# Patient Record
Sex: FEMALE | Race: WHITE
[De-identification: names, ages, dates, MRNs, and addresses within clinical notes are randomized per-mention and may not be internally consistent; named-entity substitution may affect disease eponyms.]

---

## 2020-10-09 ENCOUNTER — HOSPITAL ENCOUNTER (OUTPATIENT)
Dept: HOSPITAL 46 - DS | Age: 50
Discharge: HOME | End: 2020-10-09
Attending: SURGERY
Payer: COMMERCIAL

## 2020-10-09 VITALS — BODY MASS INDEX: 35.34 KG/M2 | HEIGHT: 64 IN | WEIGHT: 206.99 LBS

## 2020-10-09 DIAGNOSIS — K21.9: ICD-10-CM

## 2020-10-09 DIAGNOSIS — Z98.890: ICD-10-CM

## 2020-10-09 DIAGNOSIS — F32.9: ICD-10-CM

## 2020-10-09 DIAGNOSIS — F17.210: ICD-10-CM

## 2020-10-09 DIAGNOSIS — F41.9: ICD-10-CM

## 2020-10-09 DIAGNOSIS — K29.50: ICD-10-CM

## 2020-10-09 DIAGNOSIS — Z79.899: ICD-10-CM

## 2020-10-09 DIAGNOSIS — E66.9: ICD-10-CM

## 2020-10-09 DIAGNOSIS — Z12.11: Primary | ICD-10-CM

## 2020-10-09 DIAGNOSIS — Z88.8: ICD-10-CM

## 2020-10-09 DIAGNOSIS — Z86.010: ICD-10-CM

## 2020-10-09 PROCEDURE — 0DB98ZX EXCISION OF DUODENUM, VIA NATURAL OR ARTIFICIAL OPENING ENDOSCOPIC, DIAGNOSTIC: ICD-10-PCS | Performed by: SURGERY

## 2020-10-09 PROCEDURE — 0DJD8ZZ INSPECTION OF LOWER INTESTINAL TRACT, VIA NATURAL OR ARTIFICIAL OPENING ENDOSCOPIC: ICD-10-PCS | Performed by: SURGERY

## 2020-10-09 PROCEDURE — 0DB78ZX EXCISION OF STOMACH, PYLORUS, VIA NATURAL OR ARTIFICIAL OPENING ENDOSCOPIC, DIAGNOSTIC: ICD-10-PCS | Performed by: SURGERY

## 2020-10-09 PROCEDURE — G0500 MOD SEDAT ENDO SERVICE >5YRS: HCPCS

## 2020-10-09 PROCEDURE — 0DB28ZX EXCISION OF MIDDLE ESOPHAGUS, VIA NATURAL OR ARTIFICIAL OPENING ENDOSCOPIC, DIAGNOSTIC: ICD-10-PCS | Performed by: SURGERY

## 2020-10-09 PROCEDURE — 0DB38ZX EXCISION OF LOWER ESOPHAGUS, VIA NATURAL OR ARTIFICIAL OPENING ENDOSCOPIC, DIAGNOSTIC: ICD-10-PCS | Performed by: SURGERY

## 2020-10-09 NOTE — OR
[facility]
                                    [address], [place]  [address]
_________________________________________________________________________________________
                                                                 Draft    
 
 
DATE OF OPERATION:
10/09/2020
 
SURGEON:
Fish Spencer MD
 
PREOPERATIVE DIAGNOSES:
1. Gastroesophageal reflux symptoms with episodic dysphagia.
2. History of tubular adenoma of colon, 2015.
 
POSTOPERATIVE DIAGNOSES:
1. Mild antral gastritis, no evidence of esophagitis, stricture, or Duron's
epithelium; poor flap valve.  No sign of hiatal hernia proper. 
2. Normal colon to cecum.
 
PROCEDURES:
1. Esophagogastroduodenoscopy with biopsy.
2. Total colonoscopy to cecum.
 
ANESTHESIA:
Intravenous sedation, fentanyl 150 mcg and Versed 7 mg total.
 
INDICATION:
This 49-year-old white woman is a patient of ANGELINE Nagy.  She is known to me from
the past having undergone colonoscopy in 2015 at which time she was noted to have a
tubular adenoma, which was excised.  She has no symptoms of bleeding, diarrhea or
constipation and is here for surveillance colonoscopy.  Additionally, patient does have
clinical symptoms suggestive of gastroesophageal reflux, which she occasionally has mild
dysphagia.  She occasionally takes an over-the-counter H2 blocker, but does not take
anything on a routine basis.  Notably, her father had Duron's esophagus.  She has no
family history of colon cancer, esophageal cancer or stomach cancer.  She is admitted to
undergo upper endoscopy and colonoscopy.  She understands the risks of bleeding,
infection, and perforation. 
 
FINDINGS:
On upper endoscopy, she had no evidence of stricture.  There was no Duron's
epithelium.  There were no stigmata of eosinophilic esophagitis.  There was some bile in
the stomach and mild antral gastritis and the pylorus and the duodenum were normal. 
 
Colonoscopy performed showed no sign of abnormality of any sort.  She had an excellent
prep. 
 
 
                                                                                    
_________________________________________________________________________________________
PATIENT NAME:     UMA COTA                    
MEDICAL RECORD #: [id number]            OPERATIVE REPORT              
          ACCT #: [id number]  
DATE OF BIRTH:   [birth year]            REPORT #: [id number]      
PHYSICIAN:        FISH SPENCER MD                 
PCP:              LUCY ROMERO              
REPORT IS CONFIDENTIAL AND NOT TO BE RELEASED WITHOUT AUTHORIZATION
 
 
                                  [facility]
                                    [address], [place]  [address]
_________________________________________________________________________________________
                                                                 Draft    
 
 
DESCRIPTION OF PROCEDURE:
The patient was brought to endoscopy suite, given topical Hurricaine spray
hypopharyngeal anesthesia, placed in lateral decubitus position.  She was given
intravenous sedation to the point of slurred speech and nystagmus with full
cardiopulmonary monitoring. 
 
A bite block was placed. 
 
An Olympus video upper endoscope was passed in the hypopharynx.  The vocal cords
appeared normal.  Scope was advanced to the esophagus throughout its length, it was
normal with no sign of stricture or neoplasm or Duron's epithelium.  The scope was
passed to the stomach, which was insufflated with air with some bilious fluid, which was
suctioned free.  Rugal folds appeared normal as did the antral motility.  The pylorus
was normal and scope was passed through into the duodenum, which was normal.  Biopsies
were obtained there to assess for celiac disease.  The scope was withdrawn.  A biopsy
was then taken of the antrum for both DELIA and pathologic testing.  She had mild and
minimal antral gastritis.  Retroflexed view was undertaken showing a rather poor flap
valve, but no hiatal hernia proper.  Scope was straightened and withdrawn and biopsies
taken of the distal esophagus and withdrawn to the mid esophagus for biopsies there as
well.  Further withdrawal of scope showed no sign of abnormality.  Plans were then made
for colonoscopy.  Additional sedation was given.  A digital rectal examination was
performed, which showed no sign of abnormality. 
 
The Olympus video colonoscope was passed in the rectum and then manipulated throughout
the colon.  Ultimately intubating the cecum, the ileocecal valve and appendiceal orifice
were normal.  Scope was withdrawn from that point and examination throughout showed no
sign of polyps, diverticular formation, colitis, or cancer.  Retroflex view of the
rectum was normal.  Scope was removed.  The patient was taken to the recovery room in
good condition. 
 
CONCLUDING DIAGNOSES:
1. Mild antral gastritis, possibly bile reflux related.
2. Clinical gastroesophageal reflux without sign of stricture, neoplasm or Duron's
epithelium. 
3. Normal colon to cecum.
 
PLAN:
Recommend a repeat colonoscopy in 5 to 10 years sooner if clinically indicated.
Consider episodic use of H2 blocker or PPI for reflux type symptoms.  Weight loss would
be beneficial.  She will return to the ongoing care of ANGELINE Nagy. 
 
 
 
                                                                                    
_________________________________________________________________________________________
PATIENT NAME:     UMA COTA                    
MEDICAL RECORD #: [id number]            OPERATIVE REPORT              
          ACCT #: [id number]  
DATE OF BIRTH:   [birth year]            REPORT #: [id number]      
PHYSICIAN:        FISH SPENCER MD                 
PCP:              LUCY ROMERO              
REPORT IS CONFIDENTIAL AND NOT TO BE RELEASED WITHOUT AUTHORIZATION
 
 
                                  [address]  [address]
_________________________________________________________________________________________
                                                                 Draft    
 
 
 
            ________________________________________
            MD JAYLA Stephens/ROBL
Job #:  [id number]
DD:  10/09/2020 10:13:54
DT:  10/09/2020 11:16:45
 
cc:            ANGELINE Nagy
 
 
Copies:  LUCY ROMERO
~
 
 
 
 
 
 
 
 
 
 
 
 
 
 
 
 
 
 
 
 
 
 
 
 
 
 
 
 
                                                                                    
_________________________________________________________________________________________
PATIENT NAME:     UMA COTA                    
MEDICAL RECORD #: [id number]            OPERATIVE REPORT              
          ACCT #: [id number]  
DATE OF BIRTH:   [birth year]            REPORT #: [id number]      
PHYSICIAN:        FISH SPENCER MD                 
PCP:              LUCY ROMERO              
REPORT IS CONFIDENTIAL AND NOT TO BE RELEASED WITHOUT AUTHORIZATION

## 2020-10-09 NOTE — NUR
10/09/20 1012 [address]
1007 PATIENT ARRIVES TO PACU AWAKE BUT DROWSY. RESP EVEN AND
UNLABORED. ROOM AIR SATS >95%. PATIENT DENIES PAIN OR NAUSEA. PASSING
GAS.

## 2020-10-12 NOTE — PATH
[facility]
                                    [address], [place]  [address]
_________________________________________________________________________________________
                                                                 Signed   
 
 
 
SPECIMEN(S): A DUODENUM
SPECIMEN(S): B ANTRUM/PYLORUS
SPECIMEN(S): C LOWER ESOPHAGUS
SPECIMEN(S): D MIDDLE ESOPHAGUS
 
SPECIMEN SOURCE:
A. DUODENUM
B. ANTRUM/PYLORUS
C. LOWER ESOPHAGUS
D. MIDDLE ESOPHAGUS
 
CLINICAL HISTORY:
Colonoscopy.  Reflux.  History of tubular adenoma.
MICROSCOPIC DESCRIPTION:
Histologic sections of all submitted blocks are examined by light microscopy. 
These findings, together with the gross examination, support the pathologic 
diagnosis. 
 
FINAL PATHOLOGIC DIAGNOSIS:
A.  Duodenum, biopsy:
-  Duodenal mucosa with mild Brunner's gland hyperplasia.
-  Negative for dysplasia or malignancy.
B.  Stomach, antrum/pylorus, biopsy:
-  Antral and oxyntic mucosa with focal mild chronic, inactive gastritis.
-  Negative for Helicobacter organisms.
-  Negative for dysplasia or malignancy.
C.  Esophagus, lower, biopsy:
-  Squamous mucosa with no histopathologic abnormality.
-  Negative for intestinal metaplasia, dysplasia, or malignancy.
D.  Esophagus, middle, biopsy:
-  Squamous mucosa with no histopathologic abnormality.
-  Negative for intestinal metaplasia, dysplasia, or malignancy.
NAL:cml:C2NR
 
GROSS DESCRIPTION:
Four specimens are received in four containers, labeled "MH."
A. The specimen, labeled "MH," and designated on the requisition "duodenum 
biopsy," is received in formalin and consists of two fragments of pink-tan 
tissue (0.5 x 0.3 x 0.2 cm in aggregate).  The 
specimen is submitted entirely in cassette (A1).
B. The specimen, labeled "MH," and designated on the requisition 
 
                                                                                    
_________________________________________________________________________________________
PATIENT NAME:     UMA COTA                    
MEDICAL RECORD #: [id number]            PATHOLOGY                     
          ACCT #: [id number]       ACCESSION #: [id number]     
DATE OF BIRTH:   [birth year]            REPORT #: [id number]       
PHYSICIAN:        BARBARA SOTO              
PCP:              LUCY ROMERO              
REPORT IS CONFIDENTIAL AND NOT TO BE RELEASED WITHOUT AUTHORIZATION
 
 
                                  [facility]
                                    [address], [place]  [address]
_________________________________________________________________________________________
                                                                 Signed   
 
 
"antrum/pylorus biopsy," is received in formalin and consists of two fragments 
of pink-tan tissue (0.4 x 0.3 x 0.2 cm).  The specimen 
is submitted entirely in cassette (B1).
C.  The specimen, labeled "MH," and designated on the requisition "lower 
esophagus biopsy," is received in formalin and consists of three fragments of 
white-tan tissue (0.5 x 0.4 x 0.2 cm in 
aggregate).  The specimen is submitted entirely in cassette (C1).
 
D.  The specimen, labeled "MH," and designated on the requisition "middle 
esophagus biopsy," is received in formalin and consists of one fragment of 
white-tan tissue (0.3 x 0.3 x 0.2 cm).  The 
specimen is submitted entirely in cassette (D1).
AC (under the direct supervision of a pathologist)
The Gross Description was prepared using a voice recognition system.  The 
report was reviewed for accuracy; however, sound-alike word errors, addition 
and/or deletions may occur.  If there is any 
question about this report, please contact Client Services.
 
PERFORMING LABORATORY:
The technical component was performed by ethology, [address] [place] [id number] (Medical Director: Litzy Mcmullen MD; CLIA# [id number]). 
[facility] [address] [address] (IA# 
[id number]). 
 
Diagnostician:  Ngozi Ly MD
Pathologist
Electronically Signed 10/12/2020
 
 
Copies:                                
~
 
 
 
 
 
 
 
 
 
 
 
                                                                                    
_________________________________________________________________________________________
PATIENT NAME:     UMA COTA                    
MEDICAL RECORD #: [id number]            PATHOLOGY                     
          ACCT #: [id number]       ACCESSION #: [id number]     
DATE OF BIRTH:   [birth year]            REPORT #: [id number]       
PHYSICIAN:        BARBARA PATHOLOGY              
PCP:              LUCY ROMERO              
REPORT IS CONFIDENTIAL AND NOT TO BE RELEASED WITHOUT AUTHORIZATION

## 2021-02-02 ENCOUNTER — HOSPITAL ENCOUNTER (EMERGENCY)
Dept: HOSPITAL 46 - ED | Age: 51
Discharge: HOME | End: 2021-02-02
Payer: COMMERCIAL

## 2021-02-02 VITALS — HEIGHT: 64 IN | BODY MASS INDEX: 35.34 KG/M2 | WEIGHT: 207 LBS

## 2021-02-02 DIAGNOSIS — M71.21: Primary | ICD-10-CM

## 2021-02-02 DIAGNOSIS — Z88.1: ICD-10-CM

## 2021-02-02 DIAGNOSIS — F17.200: ICD-10-CM

## 2021-02-02 DIAGNOSIS — Z88.2: ICD-10-CM

## 2021-02-02 DIAGNOSIS — Z79.899: ICD-10-CM
